# Patient Record
Sex: MALE | Race: ASIAN | NOT HISPANIC OR LATINO | Employment: OTHER | URBAN - METROPOLITAN AREA
[De-identification: names, ages, dates, MRNs, and addresses within clinical notes are randomized per-mention and may not be internally consistent; named-entity substitution may affect disease eponyms.]

---

## 2020-10-07 ENCOUNTER — TELEPHONE (OUTPATIENT)
Dept: ORTHOPEDICS | Facility: CLINIC | Age: 55
End: 2020-10-07

## 2020-10-07 NOTE — TELEPHONE ENCOUNTER
Spoke with pt wife Yudi that she need to go to the Urgent Care for a referral; so appointment can be schedule for [facility]. [phone].  Pt wife stated that she would like to keep the schedule appointment with Dr. Cheng on 10/13/20; pt wife is aware of the cost and what the $151.00 covers. Patient wife Yudi states verbal understanding and has no further questions.

## 2020-10-07 NOTE — TELEPHONE ENCOUNTER
Spoke with pt; regarding pt injury to right ankle.  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  Pt ask for me to call his wife Yudi at [phone].   Left a voice message for Yudi to return call; regarding the care of the pt Dixon.   Patient states verbal understanding and has no further questions.

## 2020-10-14 PROBLEM — S82.871A CLOSED DISPLACED PILON FRACTURE OF RIGHT TIBIA: Status: ACTIVE | Noted: 2020-10-14

## 2022-04-29 LAB — CRC RECOMMENDATION EXT: NORMAL

## 2023-01-25 ENCOUNTER — DOCUMENTATION ONLY (OUTPATIENT)
Dept: ADMINISTRATIVE | Facility: HOSPITAL | Age: 58
End: 2023-01-25
Payer: MEDICAID

## 2023-03-21 VITALS
HEART RATE: 79 BPM | OXYGEN SATURATION: 99 % | TEMPERATURE: 99 F | DIASTOLIC BLOOD PRESSURE: 84 MMHG | SYSTOLIC BLOOD PRESSURE: 138 MMHG | WEIGHT: 164.44 LBS | BODY MASS INDEX: 24.92 KG/M2 | HEIGHT: 68 IN

## 2023-04-18 PROCEDURE — 85025 COMPLETE CBC W/AUTO DIFF WBC: CPT | Performed by: NURSE PRACTITIONER

## 2023-04-18 PROCEDURE — 84443 ASSAY THYROID STIM HORMONE: CPT | Performed by: NURSE PRACTITIONER

## 2023-04-18 PROCEDURE — 80061 LIPID PANEL: CPT | Performed by: NURSE PRACTITIONER

## 2023-04-18 PROCEDURE — 80053 COMPREHEN METABOLIC PANEL: CPT | Performed by: NURSE PRACTITIONER

## 2023-04-24 ENCOUNTER — OFFICE VISIT (OUTPATIENT)
Dept: FAMILY MEDICINE | Facility: CLINIC | Age: 58
End: 2023-04-24
Payer: MEDICAID

## 2023-04-24 VITALS
DIASTOLIC BLOOD PRESSURE: 100 MMHG | TEMPERATURE: 97 F | HEART RATE: 62 BPM | HEIGHT: 68 IN | SYSTOLIC BLOOD PRESSURE: 140 MMHG | BODY MASS INDEX: 24.83 KG/M2 | WEIGHT: 163.81 LBS | OXYGEN SATURATION: 99 %

## 2023-04-24 DIAGNOSIS — Z00.00 ROUTINE PHYSICAL EXAMINATION: Primary | ICD-10-CM

## 2023-04-24 DIAGNOSIS — R03.0 BLOOD PRESSURE ELEVATED WITHOUT HISTORY OF HTN: ICD-10-CM

## 2023-04-24 DIAGNOSIS — F17.200 SMOKER: ICD-10-CM

## 2023-04-24 DIAGNOSIS — J30.2 SEASONAL ALLERGIES: ICD-10-CM

## 2023-04-24 PROCEDURE — 3080F DIAST BP >= 90 MM HG: CPT | Mod: CPTII,,, | Performed by: NURSE PRACTITIONER

## 2023-04-24 PROCEDURE — 1159F PR MEDICATION LIST DOCUMENTED IN MEDICAL RECORD: ICD-10-PCS | Mod: CPTII,,, | Performed by: NURSE PRACTITIONER

## 2023-04-24 PROCEDURE — 3077F PR MOST RECENT SYSTOLIC BLOOD PRESSURE >= 140 MM HG: ICD-10-PCS | Mod: CPTII,,, | Performed by: NURSE PRACTITIONER

## 2023-04-24 PROCEDURE — 3008F PR BODY MASS INDEX (BMI) DOCUMENTED: ICD-10-PCS | Mod: CPTII,,, | Performed by: NURSE PRACTITIONER

## 2023-04-24 PROCEDURE — 99396 PREV VISIT EST AGE 40-64: CPT | Mod: ,,, | Performed by: NURSE PRACTITIONER

## 2023-04-24 PROCEDURE — 99396 PR PREVENTIVE VISIT,EST,40-64: ICD-10-PCS | Mod: ,,, | Performed by: NURSE PRACTITIONER

## 2023-04-24 PROCEDURE — 3008F BODY MASS INDEX DOCD: CPT | Mod: CPTII,,, | Performed by: NURSE PRACTITIONER

## 2023-04-24 PROCEDURE — 1160F PR REVIEW ALL MEDS BY PRESCRIBER/CLIN PHARMACIST DOCUMENTED: ICD-10-PCS | Mod: CPTII,,, | Performed by: NURSE PRACTITIONER

## 2023-04-24 PROCEDURE — 1159F MED LIST DOCD IN RCRD: CPT | Mod: CPTII,,, | Performed by: NURSE PRACTITIONER

## 2023-04-24 PROCEDURE — 3077F SYST BP >= 140 MM HG: CPT | Mod: CPTII,,, | Performed by: NURSE PRACTITIONER

## 2023-04-24 PROCEDURE — 3080F PR MOST RECENT DIASTOLIC BLOOD PRESSURE >= 90 MM HG: ICD-10-PCS | Mod: CPTII,,, | Performed by: NURSE PRACTITIONER

## 2023-04-24 PROCEDURE — 1160F RVW MEDS BY RX/DR IN RCRD: CPT | Mod: CPTII,,, | Performed by: NURSE PRACTITIONER

## 2023-04-24 RX ORDER — CETIRIZINE HYDROCHLORIDE 10 MG/1
10 TABLET ORAL DAILY
COMMUNITY
Start: 2022-03-16

## 2023-04-24 NOTE — PROGRESS NOTES
Patient ID: Dixon Rosales  : 1965    Chief Complaint: Annual Exam    Allergies: Patient has No Known Allergies.     History of Present Illness:  The patient is a 57 y.o.  male who presents to clinic for annual wellness visit.      Remains active in life and tolerates all activity. 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 Drove in from [facility] this morning. His BP is elevated today, he is asymptomatic. He feels like he eats a high salt diet. +family hx HTN.    Suffers with seasonal allergies. Takes zyrtec occasionally when symptoms are bad.     Denies changes in bowel patterns, no melena, hematochezia, rectal pain, rectal bleeding, or changes in caliber of stool.       Past Medical History:  has a past medical history of Closed displaced pilon fracture of tibia.    Surgical History:  has a past surgical history that includes Open reduction and internal fixation (ORIF) of pilon fracture (Right, 10/22/2020) and Colonoscopy (2022).    Family History: family history is not on file.    Social History:  reports that he has never smoked. He has never been exposed to tobacco smoke. He has never used smokeless tobacco. He reports that he does not drink alcohol and does not use drugs.    Care Team: Patient Care Team:  HERNÁN Matta as PCP - General (Family Medicine)     Current Medications:  Current Outpatient Medications   Medication Instructions    aspirin (ECOTRIN) 81 mg, Oral, 2 times daily    bisacodyL (DULCOLAX) 5 mg, Oral, Daily PRN    cetirizine (ZYRTEC) 10 mg, Oral, Daily    ibuprofen (ADVIL,MOTRIN) 800 mg, Oral, 3 times daily       Review of Systems   Constitutional:  Negative for activity change, appetite change, fatigue and unexpected weight change.   HENT:  Negative for ear pain and hearing loss.    Eyes:  Negative for visual disturbance.   Respiratory:  Negative for apnea, cough, chest tightness, shortness of breath and wheezing.    Cardiovascular:  Negative for  "chest pain, palpitations, leg swelling and claudication.   Gastrointestinal:  Negative for abdominal pain, anal bleeding, blood in stool, change in bowel habit, nausea, vomiting, reflux and change in bowel habit.   Endocrine: Negative for cold intolerance, heat intolerance, polydipsia, polyphagia and polyuria.   Genitourinary:  Negative for dysuria, frequency, hematuria and urgency.   Musculoskeletal:  Negative for arthralgias.   Integumentary:  Negative for rash and mole/lesion.   Allergic/Immunologic: Negative for environmental allergies.   Neurological:  Negative for dizziness, headaches and memory loss.        Visit Vitals  BP (!) 140/100   Pulse 62   Temp 97.2 °F (36.2 °C) (Temporal)   Ht 5' 8.11" (1.73 m)   Wt 74.3 kg (163 lb 12.8 oz)   SpO2 99%   BMI 24.83 kg/m²       Physical Exam  Vitals reviewed.   Constitutional:       Appearance: Normal appearance. He is normal weight.   HENT:      Right Ear: Tympanic membrane, ear canal and external ear normal.      Left Ear: Tympanic membrane, ear canal and external ear normal.      Nose: Nose normal.      Mouth/Throat:      Mouth: Mucous membranes are moist.      Pharynx: Oropharynx is clear.   Eyes:      Conjunctiva/sclera: Conjunctivae normal.      Pupils: Pupils are equal, round, and reactive to light.   Cardiovascular:      Rate and Rhythm: Normal rate and regular rhythm.      Pulses: Normal pulses.      Heart sounds: Normal heart sounds.   Pulmonary:      Effort: Pulmonary effort is normal.      Breath sounds: Normal breath sounds.   Abdominal:      General: Bowel sounds are normal.      Palpations: Abdomen is soft.   Musculoskeletal:         General: Normal range of motion.      Cervical back: Normal range of motion and neck supple.   Skin:     General: Skin is warm and dry.      Capillary Refill: Capillary refill takes less than 2 seconds.   Neurological:      Mental Status: He is alert and oriented to person, place, and time.   Psychiatric:         Mood and " Affect: Mood normal.         Behavior: Behavior normal.          Labs Reviewed:  Chemistry:  Lab Results   Component Value Date     04/18/2023    K 4.8 04/18/2023    CHLORIDE 104 04/18/2023    BUN 17.0 04/18/2023    CREATININE 0.98 04/18/2023    EGFRNORACEVR 90 04/18/2023    GLUCOSE 104 04/18/2023    CALCIUM 9.8 04/18/2023    ALKPHOS 41 (L) 04/18/2023    LABPROT 7.4 04/18/2023    ALBUMIN 4.3 04/18/2023    AST 39 04/18/2023    ALT 31 04/18/2023    TSH 1.150 04/18/2023        Hematology:  Lab Results   Component Value Date    WBC 6.7 04/18/2023    RBC 4.68 04/18/2023    HGB 15.1 04/18/2023    HCT 43.4 04/18/2023    MCV 92.7 04/18/2023    MCH 32.3 04/18/2023    MCHC 34.8 04/18/2023    RDW 12.1 04/18/2023     04/18/2023    MPV 10.5 04/18/2023    GRAN 5.3 10/14/2020    GRAN 71.3 10/14/2020    MONO 0.5 10/14/2020    MONO 6.8 10/14/2020    BASO 0.04 10/14/2020    EOSINOPHIL 2.3 10/14/2020    BASOPHIL 0.5 10/14/2020       Lipid Panel:  Lab Results   Component Value Date    CHOL 192 04/18/2023    HDL 40 04/18/2023    DLDL 112.8 (H) 04/18/2023    TRIG 114 04/18/2023        Assessment & Plan:  1. Routine physical examination  -     CBC Auto Differential; Future; Expected date: 04/24/2024  -     Comprehensive Metabolic Panel; Future; Expected date: 04/24/2024  -     Lipid Panel; Future; Expected date: 04/24/2024  -     TSH; Future; Expected date: 04/24/2024  -     Hemoglobin A1C; Future; Expected date: 04/24/2024    2. Seasonal allergies  Zyrtec as needed.     3. Smoker  Overview:  Occasional smoker; not daily    4. Blood pressure elevated without history of HTN  Advise to decreased dietary sodium intake.  F/u next week for blood pressure recheck.        Colon Cancer Screening-Done 04/2022; recommend 10 year repeat colonoscopy.  Eye Exam- Recommend annually.  Dental Exam- Recommend biannually.      Follow up in about 1 year (around 4/24/2024) for wellness, labs prior. Call sooner if needed.    Alicia Yepez,  FNP-C

## 2023-04-25 ENCOUNTER — PATIENT MESSAGE (OUTPATIENT)
Dept: RESEARCH | Facility: HOSPITAL | Age: 58
End: 2023-04-25
Payer: MEDICAID

## 2023-05-02 ENCOUNTER — PATIENT MESSAGE (OUTPATIENT)
Dept: RESEARCH | Facility: HOSPITAL | Age: 58
End: 2023-05-02
Payer: MEDICAID

## 2023-05-02 ENCOUNTER — OFFICE VISIT (OUTPATIENT)
Dept: FAMILY MEDICINE | Facility: CLINIC | Age: 58
End: 2023-05-02
Payer: MEDICAID

## 2023-05-02 VITALS
SYSTOLIC BLOOD PRESSURE: 122 MMHG | TEMPERATURE: 99 F | WEIGHT: 163.63 LBS | OXYGEN SATURATION: 99 % | HEIGHT: 68 IN | HEART RATE: 88 BPM | BODY MASS INDEX: 24.8 KG/M2 | DIASTOLIC BLOOD PRESSURE: 84 MMHG

## 2023-05-02 DIAGNOSIS — Z01.30 BLOOD PRESSURE CHECK: ICD-10-CM

## 2023-05-02 PROCEDURE — 3079F DIAST BP 80-89 MM HG: CPT | Mod: CPTII,,, | Performed by: NURSE PRACTITIONER

## 2023-05-02 PROCEDURE — 3074F PR MOST RECENT SYSTOLIC BLOOD PRESSURE < 130 MM HG: ICD-10-PCS | Mod: CPTII,,, | Performed by: NURSE PRACTITIONER

## 2023-05-02 PROCEDURE — 1159F PR MEDICATION LIST DOCUMENTED IN MEDICAL RECORD: ICD-10-PCS | Mod: CPTII,,, | Performed by: NURSE PRACTITIONER

## 2023-05-02 PROCEDURE — 3008F BODY MASS INDEX DOCD: CPT | Mod: CPTII,,, | Performed by: NURSE PRACTITIONER

## 2023-05-02 PROCEDURE — 3008F PR BODY MASS INDEX (BMI) DOCUMENTED: ICD-10-PCS | Mod: CPTII,,, | Performed by: NURSE PRACTITIONER

## 2023-05-02 PROCEDURE — 1159F MED LIST DOCD IN RCRD: CPT | Mod: CPTII,,, | Performed by: NURSE PRACTITIONER

## 2023-05-02 PROCEDURE — 3079F PR MOST RECENT DIASTOLIC BLOOD PRESSURE 80-89 MM HG: ICD-10-PCS | Mod: CPTII,,, | Performed by: NURSE PRACTITIONER

## 2023-05-02 PROCEDURE — 3074F SYST BP LT 130 MM HG: CPT | Mod: CPTII,,, | Performed by: NURSE PRACTITIONER

## 2023-05-02 PROCEDURE — 1160F PR REVIEW ALL MEDS BY PRESCRIBER/CLIN PHARMACIST DOCUMENTED: ICD-10-PCS | Mod: CPTII,,, | Performed by: NURSE PRACTITIONER

## 2023-05-02 PROCEDURE — 1160F RVW MEDS BY RX/DR IN RCRD: CPT | Mod: CPTII,,, | Performed by: NURSE PRACTITIONER

## 2023-05-02 PROCEDURE — 99212 OFFICE O/P EST SF 10 MIN: CPT | Mod: ,,, | Performed by: NURSE PRACTITIONER

## 2023-05-02 PROCEDURE — 99212 PR OFFICE/OUTPT VISIT, EST, LEVL II, 10-19 MIN: ICD-10-PCS | Mod: ,,, | Performed by: NURSE PRACTITIONER

## 2023-05-02 NOTE — PROGRESS NOTES
"Patient ID: Dixon Rosales  : 1965    Chief Complaint: Hypertension    Allergies: Patient has No Known Allergies.     History of Present Illness:  The patient is a 57 y.o.  male who presents to clinic for follow up on Hypertension     Was seen recently for annual wellness and blood pressure was noted to be high.  He felt it was related to a very high sodium diet and committed to making some dietary changes.  Here today for blood pressure recheck and it is much improved. He reports that he decreased his dietary sodium intake since last visit.    He currently does not take any blood pressure medications.       Social History:  reports that he has never smoked. He has never been exposed to tobacco smoke. He has never used smokeless tobacco. He reports current alcohol use of about 6.0 standard drinks per week. He reports that he does not use drugs.    Past Medical History:  has a past medical history of Closed displaced pilon fracture of tibia.    Current Medications:  Current Outpatient Medications   Medication Instructions    aspirin (ECOTRIN) 81 mg, Oral, 2 times daily    bisacodyL (DULCOLAX) 5 mg, Oral, Daily PRN    cetirizine (ZYRTEC) 10 mg, Oral, Daily    ibuprofen (ADVIL,MOTRIN) 800 mg, Oral, 3 times daily       Review of Systems   See HPI    Visit Vitals  /84   Pulse 88   Temp 98.6 °F (37 °C) (Oral)   Ht 5' 8" (1.727 m)   Wt 74.2 kg (163 lb 9.6 oz)   SpO2 99%   BMI 24.88 kg/m²       Physical Exam  Vitals reviewed.   Constitutional:       Appearance: Normal appearance.   Cardiovascular:      Heart sounds: Normal heart sounds.   Pulmonary:      Breath sounds: Normal breath sounds.   Skin:     General: Skin is warm and dry.          Labs Reviewed:  Chemistry:  Lab Results   Component Value Date     2023    K 4.8 2023    CHLORIDE 104 2023    BUN 17.0 2023    CREATININE 0.98 2023    EGFRNORACEVR 90 2023    GLUCOSE 104 2023    CALCIUM 9.8 " 04/18/2023    ALKPHOS 41 (L) 04/18/2023    LABPROT 7.4 04/18/2023    ALBUMIN 4.3 04/18/2023    AST 39 04/18/2023    ALT 31 04/18/2023    TSH 1.150 04/18/2023          Hematology:  Lab Results   Component Value Date    WBC 6.7 04/18/2023    RBC 4.68 04/18/2023    HGB 15.1 04/18/2023    HCT 43.4 04/18/2023    MCV 92.7 04/18/2023    MCH 32.3 04/18/2023    MCHC 34.8 04/18/2023    RDW 12.1 04/18/2023     04/18/2023    MPV 10.5 04/18/2023    GRAN 5.3 10/14/2020    GRAN 71.3 10/14/2020    MONO 0.5 10/14/2020    MONO 6.8 10/14/2020    BASO 0.04 10/14/2020    EOSINOPHIL 2.3 10/14/2020    BASOPHIL 0.5 10/14/2020       Lipid Panel:  Lab Results   Component Value Date    CHOL 192 04/18/2023    HDL 40 04/18/2023    DLDL 112.8 (H) 04/18/2023    TRIG 114 04/18/2023        Assessment & Plan:  1. Blood pressure check  Blood pressure improved today.  Continue to follow Low Sodium Diet (DASH Diet - Less than 2 grams of sodium per day).  Monitor blood pressure daily and log. Report consistent numbers greater than 140/90.  Maintain healthy weight with goal BMI <30. Exercise 30 minutes per day, 5 days per week.  Smoking cessation encouraged to aid in BP reduction.    Future Appointments   Date Time Provider Department Center   4/18/2024  8:20 AM LAB, [facility] [place] [facility]   4/25/2024  8:30 AM HERNÁN Matta [facility] [place] [facility]       Follow up if symptoms worsen or fail to improve. Call sooner if needed.    HERNÁN Ramirez    Lab Frequency Next Occurrence   CBC Auto Differential Once 04/24/2024   Comprehensive Metabolic Panel Once 04/24/2024   Lipid Panel Once 04/24/2024   TSH Once 04/24/2024   Hemoglobin A1C Once 04/24/2024

## 2024-04-18 PROCEDURE — 80061 LIPID PANEL: CPT | Performed by: NURSE PRACTITIONER

## 2024-04-18 PROCEDURE — 85025 COMPLETE CBC W/AUTO DIFF WBC: CPT | Performed by: NURSE PRACTITIONER

## 2024-04-18 PROCEDURE — 84443 ASSAY THYROID STIM HORMONE: CPT | Performed by: NURSE PRACTITIONER

## 2024-04-18 PROCEDURE — 83036 HEMOGLOBIN GLYCOSYLATED A1C: CPT | Performed by: NURSE PRACTITIONER

## 2024-04-18 PROCEDURE — 80053 COMPREHEN METABOLIC PANEL: CPT | Performed by: NURSE PRACTITIONER

## 2024-04-25 ENCOUNTER — OFFICE VISIT (OUTPATIENT)
Dept: FAMILY MEDICINE | Facility: CLINIC | Age: 59
End: 2024-04-25
Payer: MEDICAID

## 2024-04-25 VITALS
HEIGHT: 68 IN | DIASTOLIC BLOOD PRESSURE: 82 MMHG | TEMPERATURE: 98 F | HEART RATE: 79 BPM | OXYGEN SATURATION: 99 % | WEIGHT: 158 LBS | SYSTOLIC BLOOD PRESSURE: 130 MMHG | BODY MASS INDEX: 23.95 KG/M2

## 2024-04-25 DIAGNOSIS — Z00.00 ENCOUNTER FOR WELL ADULT EXAM WITHOUT ABNORMAL FINDINGS: Primary | ICD-10-CM

## 2024-04-25 PROCEDURE — 3044F HG A1C LEVEL LT 7.0%: CPT | Mod: CPTII,,, | Performed by: NURSE PRACTITIONER

## 2024-04-25 PROCEDURE — 1159F MED LIST DOCD IN RCRD: CPT | Mod: CPTII,,, | Performed by: NURSE PRACTITIONER

## 2024-04-25 PROCEDURE — 1160F RVW MEDS BY RX/DR IN RCRD: CPT | Mod: CPTII,,, | Performed by: NURSE PRACTITIONER

## 2024-04-25 PROCEDURE — 3075F SYST BP GE 130 - 139MM HG: CPT | Mod: CPTII,,, | Performed by: NURSE PRACTITIONER

## 2024-04-25 PROCEDURE — 3079F DIAST BP 80-89 MM HG: CPT | Mod: CPTII,,, | Performed by: NURSE PRACTITIONER

## 2024-04-25 PROCEDURE — 3008F BODY MASS INDEX DOCD: CPT | Mod: CPTII,,, | Performed by: NURSE PRACTITIONER

## 2024-04-25 PROCEDURE — 99396 PREV VISIT EST AGE 40-64: CPT | Mod: ,,, | Performed by: NURSE PRACTITIONER

## 2024-04-25 NOTE — PROGRESS NOTES
Patient ID: Dixon Rosales  : 1965    Chief Complaint: Annual Exam (wellness)    Allergies: Patient has No Known Allergies.     History of Present Illness:  The patient is a 58 y.o.  male who presents to clinic for annual wellness visit.      Feeling well in general. He has not health concerns or complaints today. His cholesterol has increased a bit from last year. He has maintained a healthy weight and exercises about 20 minutes each morning.; he does not eat a lot of high fat foods. He denies family hx CAD.     Past Medical History:  has a past medical history of Closed displaced pilon fracture of tibia.    Surgical History:  has a past surgical history that includes Open reduction and internal fixation (ORIF) of pilon fracture (Right, 10/22/2020) and Colonoscopy (2022).    Family History: family history includes Hypertension in his father and mother.    Social History:  reports that he has never smoked. He has never been exposed to tobacco smoke. He has never used smokeless tobacco. He reports current alcohol use of about 6.0 standard drinks of alcohol per week. He reports that he does not use drugs.    Care Team: Patient Care Team:  Alicia Yepez FNP-C as PCP - General (Family Medicine)     Current Medications:  No current outpatient medications      Review of Systems   Constitutional:  Negative for activity change, appetite change, fatigue and unexpected weight change.   HENT:  Negative for ear pain and hearing loss.    Eyes:  Negative for visual disturbance.   Respiratory:  Negative for apnea, cough, chest tightness, shortness of breath and wheezing.    Cardiovascular:  Negative for chest pain, palpitations, leg swelling and claudication.   Gastrointestinal:  Negative for abdominal pain, anal bleeding, blood in stool, change in bowel habit, nausea, vomiting and reflux.   Endocrine: Negative for cold intolerance, heat intolerance, polydipsia, polyphagia and polyuria.  "  Genitourinary:  Negative for dysuria, frequency, hematuria and urgency.   Musculoskeletal:  Negative for arthralgias.   Integumentary:  Negative for rash and mole/lesion.   Allergic/Immunologic: Negative for environmental allergies.   Neurological:  Negative for dizziness, headaches and memory loss.        Visit Vitals  /82 (BP Location: Left arm, Patient Position: Sitting, BP Method: Medium (Manual))   Pulse 79   Temp 98.2 °F (36.8 °C) (Temporal)   Ht 5' 7.99" (1.727 m)   Wt 71.7 kg (158 lb)   SpO2 99%   BMI 24.03 kg/m²       Physical Exam  Vitals reviewed.   Constitutional:       Appearance: Normal appearance. He is normal weight.   Eyes:      Conjunctiva/sclera: Conjunctivae normal.   Cardiovascular:      Rate and Rhythm: Normal rate and regular rhythm.      Pulses: Normal pulses.      Heart sounds: Normal heart sounds.   Pulmonary:      Effort: Pulmonary effort is normal.      Breath sounds: Normal breath sounds.   Abdominal:      General: Bowel sounds are normal.      Palpations: Abdomen is soft.   Musculoskeletal:      Cervical back: Neck supple.   Skin:     General: Skin is warm and dry.      Capillary Refill: Capillary refill takes less than 2 seconds.   Neurological:      Mental Status: He is alert and oriented to person, place, and time.   Psychiatric:         Mood and Affect: Mood normal.         Behavior: Behavior normal.          Labs Reviewed:  Chemistry:  Lab Results   Component Value Date     04/18/2024    K 4.5 04/18/2024    CHLORIDE 105 04/18/2024    BUN 17.0 04/18/2024    CREATININE 1.02 04/18/2024    EGFRNORACEVR 85 04/18/2024    GLUCOSE 104 04/18/2024    CALCIUM 9.8 04/18/2024    ALKPHOS 48 (L) 04/18/2024    LABPROT 7.7 04/18/2024    ALBUMIN 4.5 04/18/2024    AST 42 04/18/2024    ALT 43 04/18/2024    TSH 1.570 04/18/2024        Lab Results   Component Value Date    HGBA1C 5.4 04/18/2024        Hematology:  Lab Results   Component Value Date    WBC 6.45 04/18/2024    RBC 4.78 " 04/18/2024    HGB 15.5 04/18/2024    HCT 43.6 04/18/2024    MCV 91.2 04/18/2024    MCH 32.4 04/18/2024    MCHC 35.6 04/18/2024    RDW 11.8 04/18/2024     04/18/2024    MPV 9.7 04/18/2024    GRAN 5.3 10/14/2020    GRAN 71.3 10/14/2020    MONO 0.5 10/14/2020    MONO 6.8 10/14/2020    BASO 0.04 10/14/2020    EOSINOPHIL 2.3 10/14/2020    BASOPHIL 0.5 10/14/2020       Lipid Panel:  Lab Results   Component Value Date    CHOL 230 (H) 04/18/2024    HDL 45 04/18/2024    DLDL 136.2 (H) 04/18/2024    TRIG 134 04/18/2024        Assessment & Plan:  1. Encounter for well adult exam without abnormal findings  -     CBC Auto Differential; Future; Expected date: 04/25/2025  -     Comprehensive Metabolic Panel; Future; Expected date: 04/25/2025  -     Lipid Panel; Future; Expected date: 04/25/2025  -     TSH; Future; Expected date: 04/25/2025  -     Hemoglobin A1C; Future; Expected date: 04/25/2025         Colon Cancer Screening - UTD  Eye Exam- Recommend annually.  Dental Exam- Recommend biannually.        Follow up for 1 year, Wellness, Fasting labs prior. Call sooner if needed.    ESSENCE Ramirez-C

## 2024-06-04 ENCOUNTER — LAB VISIT (OUTPATIENT)
Dept: LAB | Facility: HOSPITAL | Age: 59
End: 2024-06-04
Attending: SURGERY
Payer: MEDICAID

## 2024-06-04 DIAGNOSIS — Z12.11 SPECIAL SCREENING FOR MALIGNANT NEOPLASMS, COLON: Primary | ICD-10-CM

## 2024-06-04 LAB
COLOR STL: NORMAL
CONSISTENCY STL: NORMAL
HEMOCCULT SP1 STL QL: NEGATIVE
HEMOCCULT SP2 STL QL: NEGATIVE
HEMOCCULT SP3 STL QL: NEGATIVE

## 2024-06-04 PROCEDURE — 82270 OCCULT BLOOD FECES: CPT

## 2025-04-23 ENCOUNTER — RESULTS FOLLOW-UP (OUTPATIENT)
Dept: FAMILY MEDICINE | Facility: CLINIC | Age: 60
End: 2025-04-23

## 2025-04-23 PROCEDURE — 85025 COMPLETE CBC W/AUTO DIFF WBC: CPT | Performed by: NURSE PRACTITIONER

## 2025-04-23 PROCEDURE — 84443 ASSAY THYROID STIM HORMONE: CPT | Performed by: NURSE PRACTITIONER

## 2025-04-23 PROCEDURE — 80061 LIPID PANEL: CPT | Performed by: NURSE PRACTITIONER

## 2025-04-23 PROCEDURE — 80053 COMPREHEN METABOLIC PANEL: CPT | Performed by: NURSE PRACTITIONER

## 2025-04-23 PROCEDURE — 83036 HEMOGLOBIN GLYCOSYLATED A1C: CPT | Performed by: NURSE PRACTITIONER
